# Patient Record
Sex: FEMALE | Race: BLACK OR AFRICAN AMERICAN | Employment: STUDENT | URBAN - METROPOLITAN AREA
[De-identification: names, ages, dates, MRNs, and addresses within clinical notes are randomized per-mention and may not be internally consistent; named-entity substitution may affect disease eponyms.]

---
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
## 2025-05-19 ENCOUNTER — OFFICE VISIT (OUTPATIENT)
Dept: FAMILY MEDICINE CLINIC | Facility: CLINIC | Age: 20
End: 2025-05-19
Payer: COMMERCIAL

## 2025-05-19 VITALS
TEMPERATURE: 99 F | BODY MASS INDEX: 24 KG/M2 | OXYGEN SATURATION: 100 % | WEIGHT: 144 LBS | DIASTOLIC BLOOD PRESSURE: 70 MMHG | RESPIRATION RATE: 18 BRPM | HEART RATE: 102 BPM | SYSTOLIC BLOOD PRESSURE: 110 MMHG

## 2025-05-19 DIAGNOSIS — Z02.1 PHYSICAL EXAM, PRE-EMPLOYMENT: Primary | ICD-10-CM

## 2025-05-19 NOTE — PROGRESS NOTES
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     Patient has concerns of none.      Current Medications[1]   Past Medical History[2]   Past Surgical History[3]   Family History[4]   Social History:  Short Social Hx on File[5]   Occ: student. .        REVIEW OF SYSTEMS:   GENERAL: Feels well otherwise  SKIN: denies any unusual skin lesions  EYES:denies blurred vision or double vision.  Wears contacts.  HEENT: denies nasal congestion, sinus pain or ST  LUNGS: denies shortness of breath at rest on on exertion  CARDIOVASCULAR: denies chest pain or palpitations   GI: denies abdominal pain or heartburn.  No N/V/C/D.  : denies vaginal discharge, dysuria, suprapubic pain.   MUSCULOSKELETAL: denies back pain or other joint pain  NEURO: denies headaches  PSYCHE: denies depression or anxiety  HEMATOLOGIC: denies hx of anemia or other bleeding disorders  ENDOCRINE: denies thyroid history  ALLERGY/ASTHMA: denies hx of seasonal allergies or asthma    EXAM:   /70   Pulse 102   Temp 99.1 °F (37.3 °C) (Oral)   Resp 18   Wt 144 lb (65.3 kg)   LMP 04/29/2025 (Exact Date)   SpO2 100%   BMI 23.82 kg/m²   Body mass index is 23.82 kg/m².     GENERAL: well developed, well nourished,in no apparent distress  SKIN: no rashes,no suspicious lesions  HEENT: atraumatic, normocephalic,ears and throat are clear  EYES:PERRLA, EOMI, normal optic disk,conjunctiva are clear  NECK: supple,no adenopathy,no bruits  CHEST: no chest tenderness  LUNGS: Clear to auscultation bilaterally. No diminished breath sounds. No wheezing, rhonchi, or rales.  CARDIO: RRR without murmur  GI: BS's present x4., No tenderness of palpation.  No obvious masses or palpable organomegaly   MUSCULOSKELETAL: back is not tender, ROM of the back fully intact  EXTREMITIES: no cyanosis, clubbing or edema  NEURO: Alert & Oriented x3. Cranial nerves are  grossly intact.     ASSESSMENT AND PLAN:   Jess Cintron is a 19 year old female who presents for a pre volunteer physical exam. Patient's current BMI is Body mass index is 23.82 kg/m²..      ASSESSMENT:     PLAN: Encouraged patient to have full physical with health maintenance labs done by PCP within this year.     Form filled out and given to patient.  Form was copied and sent to scanning.     Patient's questions/concerns were addressed and answered. Patient is in agreement with treatment plan.     Patient is to follow up as needed with PCP or here in clinic.         [1]   Current Outpatient Medications   Medication Sig Dispense Refill    Ferrous Sulfate 325 (65 Fe) MG Oral Tab Take 1 tablet (325 mg total) by mouth daily with breakfast. 90 tablet 3   [2] No past medical history on file.  [3] No past surgical history on file.  [4]   Family History  Problem Relation Age of Onset    Hyperlipidemia Mother     Hypertension Father     Cancer Maternal Grandfather     Stroke Paternal Grandfather    [5]   Social History  Socioeconomic History    Marital status: Single   Tobacco Use    Smoking status: Never    Smokeless tobacco: Never   Vaping Use    Vaping status: Never Used   Substance and Sexual Activity    Alcohol use: Never    Drug use: Not Currently    Sexual activity: Not Currently   Other Topics Concern    Caffeine Concern Yes     Comment: 1 cup daily    Exercise Yes    Seat Belt No    Special Diet Yes     Comment: No lactose    Stress Concern No    Weight Concern No

## 2025-05-21 ENCOUNTER — OFFICE VISIT (OUTPATIENT)
Dept: FAMILY MEDICINE CLINIC | Facility: CLINIC | Age: 20
End: 2025-05-21
Payer: COMMERCIAL

## 2025-05-21 DIAGNOSIS — Z11.1 ENCOUNTER FOR PPD SKIN TEST READING: Primary | ICD-10-CM

## (undated) NOTE — LETTER
PPD must be read within 48-72 hours after placement    Please return on 5/21 after 4:10 pm or 5/22 before 4:10 pm    Monday-Friday 8:00 am-7:30 pm  Saturday-Sunday 8:002 am -3:30 pm

## (undated) NOTE — LETTER
May 21, 2025          Jess Cintron  [address]  [place] [address]          Dear Jess:    The following are the results of your recent tests. Please review the list of test results.  Your result is the value on the left; we have also supplied the range of normal (low and high) values.    Results for orders placed or performed in visit on 05/19/25   TB test, PPD/Tubersol/Aplisol [13831] (Dx Z11.1)    Collection Time: 05/19/25  4:18 PM   Result Value Ref Range    Date Given: 5/19/2025     Site: right forearm     INDURATION (PPD) 0.0 0.0 - 11 mm       Please call if you have further questions,